# Patient Record
Sex: FEMALE | Race: WHITE
[De-identification: names, ages, dates, MRNs, and addresses within clinical notes are randomized per-mention and may not be internally consistent; named-entity substitution may affect disease eponyms.]

---

## 2017-07-10 ENCOUNTER — HOSPITAL ENCOUNTER (OUTPATIENT)
Dept: HOSPITAL 8 - RAD | Age: 77
Discharge: HOME | End: 2017-07-10
Attending: INTERNAL MEDICINE
Payer: MEDICARE

## 2017-07-10 DIAGNOSIS — Z51.11: Primary | ICD-10-CM

## 2017-07-10 DIAGNOSIS — Z45.2: ICD-10-CM

## 2017-07-10 DIAGNOSIS — C50.919: ICD-10-CM

## 2017-07-10 PROCEDURE — 76937 US GUIDE VASCULAR ACCESS: CPT

## 2017-07-10 PROCEDURE — 36569 INSJ PICC 5 YR+ W/O IMAGING: CPT

## 2017-07-10 PROCEDURE — 77001 FLUOROGUIDE FOR VEIN DEVICE: CPT

## 2017-07-10 PROCEDURE — C1751 CATH, INF, PER/CENT/MIDLINE: HCPCS

## 2017-10-10 ENCOUNTER — HOSPITAL ENCOUNTER (EMERGENCY)
Dept: HOSPITAL 92 - SCSER | Age: 77
Discharge: HOME | End: 2017-10-10
Payer: MEDICARE

## 2017-10-10 DIAGNOSIS — R07.81: Primary | ICD-10-CM

## 2017-10-10 NOTE — RAD
THREE VIEWS LEFT SHOULDER:

10/10/17

 

HISTORY: 

Posterior left shoulder pain near the region of the scapula. No history of trauma. Patient reports h
earing a snap in left shoulder while leaning over to lay down a bag. 

 

FINDINGS:  

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 No acute fracture or dislocation is ident
ified. Surgical clips overlie the left axillary region. 

 

IMPRESSION:  

1.      No acute osseous abnormality. 

2.      Left acromioclavicular joint osteoarthritis. 

 

POS: SARA

## 2017-10-10 NOTE — RAD
THREE VIEWS LEFT RIBS

10/10/17

 

HISTORY: 

Sudden onset of pain after bending over to lay down a bag. Patient states left shoulder pain. Patien
t has metastatic disease from cancer by provided history. 

 

FINDINGS:  

No left sided rib fracture is seen. No obvious lytic or sclerotic osseous lesion is appreciated. Amara
gical clips overlie the left axillary region. Vascular calcifications overlie the left upper quadran
t. Degenerative changes are see in the spine. 

 

IMPRESSION:  

No left sided rib fracture or osseous rib lesion is seen. 

 

POS: SARA

## 2017-10-11 ENCOUNTER — HOSPITAL ENCOUNTER (OUTPATIENT)
Dept: HOSPITAL 92 - ONC/OP | Age: 77
Discharge: HOME | End: 2017-10-11
Attending: INTERNAL MEDICINE
Payer: MEDICARE

## 2017-10-11 DIAGNOSIS — C50.212: Primary | ICD-10-CM

## 2017-10-11 DIAGNOSIS — Z88.5: ICD-10-CM

## 2017-10-11 DIAGNOSIS — C79.51: ICD-10-CM

## 2017-10-11 PROCEDURE — 96372 THER/PROPH/DIAG INJ SC/IM: CPT

## 2017-11-08 ENCOUNTER — HOSPITAL ENCOUNTER (OUTPATIENT)
Dept: HOSPITAL 92 - ONC/OP | Age: 77
Discharge: HOME | End: 2017-11-08
Attending: INTERNAL MEDICINE
Payer: MEDICARE

## 2017-11-08 DIAGNOSIS — Z79.899: ICD-10-CM

## 2017-11-08 DIAGNOSIS — C50.212: ICD-10-CM

## 2017-11-08 DIAGNOSIS — Z88.5: ICD-10-CM

## 2017-11-08 DIAGNOSIS — Z51.11: Primary | ICD-10-CM

## 2017-11-08 DIAGNOSIS — C79.51: ICD-10-CM

## 2017-11-08 PROCEDURE — 96372 THER/PROPH/DIAG INJ SC/IM: CPT

## 2017-12-06 ENCOUNTER — HOSPITAL ENCOUNTER (OUTPATIENT)
Dept: HOSPITAL 92 - ONC/OP | Age: 77
Discharge: HOME | End: 2017-12-06
Attending: INTERNAL MEDICINE
Payer: MEDICARE

## 2017-12-06 VITALS — DIASTOLIC BLOOD PRESSURE: 68 MMHG | TEMPERATURE: 97.8 F | SYSTOLIC BLOOD PRESSURE: 160 MMHG

## 2017-12-06 DIAGNOSIS — Z98.890: ICD-10-CM

## 2017-12-06 DIAGNOSIS — Z79.899: ICD-10-CM

## 2017-12-06 DIAGNOSIS — Z51.11: Primary | ICD-10-CM

## 2017-12-06 DIAGNOSIS — C79.51: ICD-10-CM

## 2017-12-06 DIAGNOSIS — C50.212: ICD-10-CM

## 2017-12-06 DIAGNOSIS — Z88.5: ICD-10-CM

## 2017-12-06 PROCEDURE — 36415 COLL VENOUS BLD VENIPUNCTURE: CPT

## 2017-12-06 PROCEDURE — 84100 ASSAY OF PHOSPHORUS: CPT

## 2017-12-06 PROCEDURE — 83615 LACTATE (LD) (LDH) ENZYME: CPT

## 2017-12-06 PROCEDURE — 84550 ASSAY OF BLOOD/URIC ACID: CPT

## 2017-12-06 PROCEDURE — 96372 THER/PROPH/DIAG INJ SC/IM: CPT

## 2017-12-06 PROCEDURE — 82248 BILIRUBIN DIRECT: CPT

## 2017-12-06 PROCEDURE — 80053 COMPREHEN METABOLIC PANEL: CPT

## 2018-01-04 ENCOUNTER — HOSPITAL ENCOUNTER (OUTPATIENT)
Dept: HOSPITAL 92 - ONC/OP | Age: 78
Discharge: HOME | End: 2018-01-04
Attending: INTERNAL MEDICINE
Payer: MEDICARE

## 2018-01-04 DIAGNOSIS — C79.51: ICD-10-CM

## 2018-01-04 DIAGNOSIS — C50.212: Primary | ICD-10-CM

## 2018-01-04 DIAGNOSIS — Z88.5: ICD-10-CM

## 2018-01-04 PROCEDURE — 96372 THER/PROPH/DIAG INJ SC/IM: CPT

## 2018-01-19 ENCOUNTER — HOSPITAL ENCOUNTER (OUTPATIENT)
Dept: HOSPITAL 8 - RAD | Age: 78
Discharge: HOME | End: 2018-01-19
Attending: INTERNAL MEDICINE
Payer: MEDICARE

## 2018-01-19 DIAGNOSIS — C50.919: ICD-10-CM

## 2018-01-19 DIAGNOSIS — Z45.2: Primary | ICD-10-CM

## 2018-01-19 PROCEDURE — 36569 INSJ PICC 5 YR+ W/O IMAGING: CPT

## 2018-01-19 PROCEDURE — 76937 US GUIDE VASCULAR ACCESS: CPT

## 2018-01-19 PROCEDURE — 77001 FLUOROGUIDE FOR VEIN DEVICE: CPT

## 2018-01-19 PROCEDURE — C1751 CATH, INF, PER/CENT/MIDLINE: HCPCS

## 2018-02-08 ENCOUNTER — HOSPITAL ENCOUNTER (OUTPATIENT)
Dept: HOSPITAL 92 - ONC/OP | Age: 78
Discharge: HOME | End: 2018-02-08
Attending: INTERNAL MEDICINE
Payer: MEDICARE

## 2018-02-08 DIAGNOSIS — Z88.5: ICD-10-CM

## 2018-02-08 DIAGNOSIS — Z79.899: ICD-10-CM

## 2018-02-08 DIAGNOSIS — Z98.890: ICD-10-CM

## 2018-02-08 DIAGNOSIS — C79.51: ICD-10-CM

## 2018-02-08 DIAGNOSIS — Z79.818: ICD-10-CM

## 2018-02-08 DIAGNOSIS — C50.212: ICD-10-CM

## 2018-02-08 DIAGNOSIS — Z51.11: Primary | ICD-10-CM

## 2018-02-08 LAB
ALBUMIN SERPL BCG-MCNC: 4 G/DL (ref 3.4–4.8)
ALP SERPL-CCNC: 75 U/L (ref 40–150)
ALT SERPL W P-5'-P-CCNC: 27 U/L (ref 8–55)
ANION GAP SERPL CALC-SCNC: 14 MMOL/L (ref 10–20)
AST SERPL-CCNC: 32 U/L (ref 5–34)
BILIRUB SERPL-MCNC: 0.3 MG/DL (ref 0.2–1.2)
BUN SERPL-MCNC: 20 MG/DL (ref 9.8–20.1)
CALCIUM SERPL-MCNC: 9.3 MG/DL (ref 7.8–10.44)
CHLORIDE SERPL-SCNC: 103 MMOL/L (ref 98–107)
CO2 SERPL-SCNC: 24 MMOL/L (ref 23–31)
CREAT CL PREDICTED SERPL C-G-VRATE: 0 ML/MIN (ref 70–130)
GLOBULIN SER CALC-MCNC: 2.5 G/DL (ref 2.4–3.5)
GLUCOSE SERPL-MCNC: 99 MG/DL (ref 83–110)
LDH1 SERPL-CCNC: 200 U/L (ref 125–220)
POTASSIUM SERPL-SCNC: 4.1 MMOL/L (ref 3.5–5.1)
SODIUM SERPL-SCNC: 137 MMOL/L (ref 136–145)
URATE SERPL-MCNC: 5.1 MG/DL (ref 2.6–6)

## 2018-02-08 PROCEDURE — 80053 COMPREHEN METABOLIC PANEL: CPT

## 2018-02-08 PROCEDURE — 96372 THER/PROPH/DIAG INJ SC/IM: CPT

## 2018-02-08 PROCEDURE — 84550 ASSAY OF BLOOD/URIC ACID: CPT

## 2018-02-08 PROCEDURE — 83615 LACTATE (LD) (LDH) ENZYME: CPT

## 2018-02-08 PROCEDURE — 36415 COLL VENOUS BLD VENIPUNCTURE: CPT

## 2018-03-12 ENCOUNTER — HOSPITAL ENCOUNTER (OUTPATIENT)
Dept: HOSPITAL 92 - ONC/OP | Age: 78
Discharge: HOME | End: 2018-03-12
Attending: INTERNAL MEDICINE
Payer: MEDICARE

## 2018-03-12 VITALS — SYSTOLIC BLOOD PRESSURE: 155 MMHG | TEMPERATURE: 97.4 F | DIASTOLIC BLOOD PRESSURE: 65 MMHG

## 2018-03-12 DIAGNOSIS — C79.51: ICD-10-CM

## 2018-03-12 DIAGNOSIS — C50.212: Primary | ICD-10-CM

## 2018-03-12 DIAGNOSIS — Z88.5: ICD-10-CM

## 2018-03-12 PROCEDURE — 96372 THER/PROPH/DIAG INJ SC/IM: CPT

## 2018-04-10 ENCOUNTER — HOSPITAL ENCOUNTER (OUTPATIENT)
Dept: HOSPITAL 92 - ONC/OP | Age: 78
Discharge: HOME | End: 2018-04-10
Attending: INTERNAL MEDICINE
Payer: MEDICARE

## 2018-04-10 VITALS — TEMPERATURE: 97.7 F | SYSTOLIC BLOOD PRESSURE: 131 MMHG | DIASTOLIC BLOOD PRESSURE: 60 MMHG

## 2018-04-10 DIAGNOSIS — C79.51: ICD-10-CM

## 2018-04-10 DIAGNOSIS — Z51.11: Primary | ICD-10-CM

## 2018-04-10 DIAGNOSIS — Z79.899: ICD-10-CM

## 2018-04-10 DIAGNOSIS — C50.212: ICD-10-CM

## 2018-04-10 DIAGNOSIS — Z88.5: ICD-10-CM

## 2018-04-10 LAB
ALBUMIN SERPL BCG-MCNC: 4.3 G/DL (ref 3.4–4.8)
ALP SERPL-CCNC: 61 U/L (ref 40–150)
ALT SERPL W P-5'-P-CCNC: 20 U/L (ref 8–55)
ANION GAP SERPL CALC-SCNC: 9 MMOL/L (ref 10–20)
AST SERPL-CCNC: 29 U/L (ref 5–34)
BILIRUB SERPL-MCNC: 0.3 MG/DL (ref 0.2–1.2)
BUN SERPL-MCNC: 16 MG/DL (ref 9.8–20.1)
CALCIUM SERPL-MCNC: 9.9 MG/DL (ref 7.8–10.44)
CHLORIDE SERPL-SCNC: 102 MMOL/L (ref 98–107)
CO2 SERPL-SCNC: 29 MMOL/L (ref 23–31)
CREAT CL PREDICTED SERPL C-G-VRATE: 56 ML/MIN (ref 70–130)
GLOBULIN SER CALC-MCNC: 2.5 G/DL (ref 2.4–3.5)
GLUCOSE SERPL-MCNC: 93 MG/DL (ref 83–110)
LDH1 SERPL-CCNC: 207 U/L (ref 125–220)
POTASSIUM SERPL-SCNC: 4.1 MMOL/L (ref 3.5–5.1)
SODIUM SERPL-SCNC: 136 MMOL/L (ref 136–145)
URATE SERPL-MCNC: 4.6 MG/DL (ref 2.6–6)

## 2018-04-10 PROCEDURE — 96372 THER/PROPH/DIAG INJ SC/IM: CPT

## 2018-04-10 PROCEDURE — 83615 LACTATE (LD) (LDH) ENZYME: CPT

## 2018-04-10 PROCEDURE — 84550 ASSAY OF BLOOD/URIC ACID: CPT

## 2018-04-10 PROCEDURE — 36415 COLL VENOUS BLD VENIPUNCTURE: CPT

## 2018-04-10 PROCEDURE — 80053 COMPREHEN METABOLIC PANEL: CPT

## 2018-05-08 ENCOUNTER — HOSPITAL ENCOUNTER (OUTPATIENT)
Dept: HOSPITAL 92 - ONC/OP | Age: 78
Discharge: HOME | End: 2018-05-08
Attending: INTERNAL MEDICINE
Payer: MEDICARE

## 2018-05-08 VITALS — DIASTOLIC BLOOD PRESSURE: 60 MMHG | TEMPERATURE: 97.2 F | SYSTOLIC BLOOD PRESSURE: 133 MMHG

## 2018-05-08 DIAGNOSIS — Z98.890: ICD-10-CM

## 2018-05-08 DIAGNOSIS — C50.212: ICD-10-CM

## 2018-05-08 DIAGNOSIS — C79.51: ICD-10-CM

## 2018-05-08 DIAGNOSIS — Z51.11: Primary | ICD-10-CM

## 2018-05-08 DIAGNOSIS — Z88.5: ICD-10-CM

## 2018-05-08 PROCEDURE — 96372 THER/PROPH/DIAG INJ SC/IM: CPT

## 2018-06-08 ENCOUNTER — HOSPITAL ENCOUNTER (OUTPATIENT)
Dept: HOSPITAL 92 - ONC/OP | Age: 78
Discharge: HOME | End: 2018-06-08
Attending: INTERNAL MEDICINE
Payer: MEDICARE

## 2018-06-08 DIAGNOSIS — Z51.11: Primary | ICD-10-CM

## 2018-06-08 DIAGNOSIS — Z88.5: ICD-10-CM

## 2018-06-08 DIAGNOSIS — C79.51: ICD-10-CM

## 2018-06-08 DIAGNOSIS — C50.212: ICD-10-CM

## 2018-06-08 PROCEDURE — 96372 THER/PROPH/DIAG INJ SC/IM: CPT

## 2018-07-12 ENCOUNTER — HOSPITAL ENCOUNTER (OUTPATIENT)
Dept: HOSPITAL 92 - ONC/OP | Age: 78
Discharge: HOME | End: 2018-07-12
Attending: INTERNAL MEDICINE
Payer: MEDICARE

## 2018-07-12 DIAGNOSIS — Z88.5: ICD-10-CM

## 2018-07-12 DIAGNOSIS — Z51.11: Primary | ICD-10-CM

## 2018-07-12 DIAGNOSIS — C50.212: ICD-10-CM

## 2018-07-12 DIAGNOSIS — C79.51: ICD-10-CM

## 2018-07-12 PROCEDURE — 96372 THER/PROPH/DIAG INJ SC/IM: CPT

## 2018-08-16 ENCOUNTER — HOSPITAL ENCOUNTER (OUTPATIENT)
Dept: HOSPITAL 92 - ONC/OP | Age: 78
Discharge: HOME | End: 2018-08-16
Attending: INTERNAL MEDICINE
Payer: MEDICARE

## 2018-08-16 VITALS — DIASTOLIC BLOOD PRESSURE: 63 MMHG | SYSTOLIC BLOOD PRESSURE: 137 MMHG

## 2018-08-16 DIAGNOSIS — Z51.11: Primary | ICD-10-CM

## 2018-08-16 DIAGNOSIS — Z88.5: ICD-10-CM

## 2018-08-16 DIAGNOSIS — C50.212: ICD-10-CM

## 2018-08-16 PROCEDURE — 96372 THER/PROPH/DIAG INJ SC/IM: CPT

## 2018-09-14 ENCOUNTER — HOSPITAL ENCOUNTER (OUTPATIENT)
Dept: HOSPITAL 92 - ONC/OP | Age: 78
Discharge: HOME | End: 2018-09-14
Attending: INTERNAL MEDICINE
Payer: MEDICARE

## 2018-09-14 DIAGNOSIS — Z51.11: Primary | ICD-10-CM

## 2018-09-14 DIAGNOSIS — Z88.5: ICD-10-CM

## 2018-09-14 DIAGNOSIS — C50.212: ICD-10-CM

## 2018-09-14 PROCEDURE — 96372 THER/PROPH/DIAG INJ SC/IM: CPT

## 2018-10-19 ENCOUNTER — HOSPITAL ENCOUNTER (OUTPATIENT)
Dept: HOSPITAL 92 - ONC/OP | Age: 78
Discharge: HOME | End: 2018-10-19
Attending: INTERNAL MEDICINE
Payer: MEDICARE

## 2018-10-19 VITALS — DIASTOLIC BLOOD PRESSURE: 64 MMHG | SYSTOLIC BLOOD PRESSURE: 136 MMHG | TEMPERATURE: 97.9 F

## 2018-10-19 DIAGNOSIS — C79.51: ICD-10-CM

## 2018-10-19 DIAGNOSIS — Z88.5: ICD-10-CM

## 2018-10-19 DIAGNOSIS — C50.212: ICD-10-CM

## 2018-10-19 DIAGNOSIS — Z51.11: Primary | ICD-10-CM

## 2018-10-19 PROCEDURE — 96372 THER/PROPH/DIAG INJ SC/IM: CPT

## 2018-11-21 ENCOUNTER — HOSPITAL ENCOUNTER (OUTPATIENT)
Dept: HOSPITAL 92 - ONC/OP | Age: 78
Discharge: HOME | End: 2018-11-21
Attending: INTERNAL MEDICINE
Payer: MEDICARE

## 2018-11-21 VITALS — TEMPERATURE: 97.6 F | SYSTOLIC BLOOD PRESSURE: 151 MMHG | DIASTOLIC BLOOD PRESSURE: 64 MMHG

## 2018-11-21 DIAGNOSIS — Z51.11: Primary | ICD-10-CM

## 2018-11-21 DIAGNOSIS — Z88.5: ICD-10-CM

## 2018-11-21 DIAGNOSIS — C50.212: ICD-10-CM

## 2018-11-21 DIAGNOSIS — C79.51: ICD-10-CM

## 2018-11-21 PROCEDURE — 96372 THER/PROPH/DIAG INJ SC/IM: CPT

## 2018-12-21 ENCOUNTER — HOSPITAL ENCOUNTER (OUTPATIENT)
Dept: HOSPITAL 92 - ONC/OP | Age: 78
Discharge: HOME | End: 2018-12-21
Attending: INTERNAL MEDICINE
Payer: MEDICARE

## 2018-12-21 VITALS — SYSTOLIC BLOOD PRESSURE: 126 MMHG | DIASTOLIC BLOOD PRESSURE: 58 MMHG | TEMPERATURE: 97.7 F

## 2018-12-21 DIAGNOSIS — Z79.811: ICD-10-CM

## 2018-12-21 DIAGNOSIS — Z79.899: ICD-10-CM

## 2018-12-21 DIAGNOSIS — C79.51: ICD-10-CM

## 2018-12-21 DIAGNOSIS — Z51.11: Primary | ICD-10-CM

## 2018-12-21 DIAGNOSIS — Z88.5: ICD-10-CM

## 2018-12-21 DIAGNOSIS — C50.212: ICD-10-CM

## 2018-12-21 PROCEDURE — 96372 THER/PROPH/DIAG INJ SC/IM: CPT

## 2019-01-25 ENCOUNTER — HOSPITAL ENCOUNTER (OUTPATIENT)
Dept: HOSPITAL 92 - ONC/OP | Age: 79
Discharge: HOME | End: 2019-01-25
Attending: INTERNAL MEDICINE
Payer: MEDICARE

## 2019-01-25 VITALS — SYSTOLIC BLOOD PRESSURE: 144 MMHG | DIASTOLIC BLOOD PRESSURE: 59 MMHG | TEMPERATURE: 98 F

## 2019-01-25 DIAGNOSIS — Z79.811: ICD-10-CM

## 2019-01-25 DIAGNOSIS — Z79.899: ICD-10-CM

## 2019-01-25 DIAGNOSIS — E78.5: ICD-10-CM

## 2019-01-25 DIAGNOSIS — Z88.5: ICD-10-CM

## 2019-01-25 DIAGNOSIS — C50.212: ICD-10-CM

## 2019-01-25 DIAGNOSIS — C76.51: ICD-10-CM

## 2019-01-25 DIAGNOSIS — Z51.11: Primary | ICD-10-CM

## 2019-01-25 DIAGNOSIS — Z17.0: ICD-10-CM

## 2019-01-25 PROCEDURE — 96372 THER/PROPH/DIAG INJ SC/IM: CPT

## 2019-02-22 ENCOUNTER — HOSPITAL ENCOUNTER (OUTPATIENT)
Dept: HOSPITAL 92 - ONC/OP | Age: 79
Discharge: HOME | End: 2019-02-22
Attending: INTERNAL MEDICINE
Payer: MEDICARE

## 2019-02-22 VITALS — DIASTOLIC BLOOD PRESSURE: 62 MMHG | SYSTOLIC BLOOD PRESSURE: 138 MMHG

## 2019-02-22 DIAGNOSIS — C50.212: ICD-10-CM

## 2019-02-22 DIAGNOSIS — Z88.5: ICD-10-CM

## 2019-02-22 DIAGNOSIS — C79.51: ICD-10-CM

## 2019-02-22 DIAGNOSIS — Z51.12: Primary | ICD-10-CM

## 2019-02-22 PROCEDURE — 96401 CHEMO ANTI-NEOPL SQ/IM: CPT

## 2019-03-28 ENCOUNTER — HOSPITAL ENCOUNTER (OUTPATIENT)
Dept: HOSPITAL 92 - ONC/OP | Age: 79
Discharge: HOME | End: 2019-03-28
Attending: INTERNAL MEDICINE
Payer: MEDICARE

## 2019-03-28 DIAGNOSIS — C79.51: ICD-10-CM

## 2019-03-28 DIAGNOSIS — Z79.899: ICD-10-CM

## 2019-03-28 DIAGNOSIS — Z88.5: ICD-10-CM

## 2019-03-28 DIAGNOSIS — Z51.12: Primary | ICD-10-CM

## 2019-03-28 DIAGNOSIS — C50.212: ICD-10-CM

## 2019-03-28 PROCEDURE — 96372 THER/PROPH/DIAG INJ SC/IM: CPT
